# Patient Record
Sex: MALE | Race: WHITE | NOT HISPANIC OR LATINO
[De-identification: names, ages, dates, MRNs, and addresses within clinical notes are randomized per-mention and may not be internally consistent; named-entity substitution may affect disease eponyms.]

---

## 2017-09-20 ENCOUNTER — APPOINTMENT (OUTPATIENT)
Dept: GASTROENTEROLOGY | Facility: CLINIC | Age: 62
End: 2017-09-20
Payer: COMMERCIAL

## 2017-09-20 VITALS
HEIGHT: 65.5 IN | SYSTOLIC BLOOD PRESSURE: 118 MMHG | DIASTOLIC BLOOD PRESSURE: 80 MMHG | BODY MASS INDEX: 32.59 KG/M2 | WEIGHT: 198 LBS | TEMPERATURE: 98.4 F

## 2017-09-20 DIAGNOSIS — Z86.39 PERSONAL HISTORY OF OTHER ENDOCRINE, NUTRITIONAL AND METABOLIC DISEASE: ICD-10-CM

## 2017-09-20 DIAGNOSIS — I25.2 OLD MYOCARDIAL INFARCTION: ICD-10-CM

## 2017-09-20 DIAGNOSIS — Z80.1 FAMILY HISTORY OF MALIGNANT NEOPLASM OF TRACHEA, BRONCHUS AND LUNG: ICD-10-CM

## 2017-09-20 DIAGNOSIS — R12 HEARTBURN: ICD-10-CM

## 2017-09-20 DIAGNOSIS — R19.8 OTHER SPECIFIED SYMPTOMS AND SIGNS INVOLVING THE DIGESTIVE SYSTEM AND ABDOMEN: ICD-10-CM

## 2017-09-20 DIAGNOSIS — Z80.0 FAMILY HISTORY OF MALIGNANT NEOPLASM OF DIGESTIVE ORGANS: ICD-10-CM

## 2017-09-20 DIAGNOSIS — Z86.79 PERSONAL HISTORY OF OTHER DISEASES OF THE CIRCULATORY SYSTEM: ICD-10-CM

## 2017-09-20 DIAGNOSIS — Z12.11 ENCOUNTER FOR SCREENING FOR MALIGNANT NEOPLASM OF COLON: ICD-10-CM

## 2017-09-20 PROCEDURE — 99204 OFFICE O/P NEW MOD 45 MIN: CPT

## 2018-01-26 ENCOUNTER — APPOINTMENT (OUTPATIENT)
Dept: GASTROENTEROLOGY | Facility: AMBULATORY MEDICAL SERVICES | Age: 63
End: 2018-01-26
Payer: COMMERCIAL

## 2018-01-26 DIAGNOSIS — K29.01 ACUTE GASTRITIS WITH BLEEDING: ICD-10-CM

## 2018-01-26 PROCEDURE — 43239 EGD BIOPSY SINGLE/MULTIPLE: CPT

## 2018-01-26 PROCEDURE — 45380 COLONOSCOPY AND BIOPSY: CPT

## 2018-02-09 ENCOUNTER — APPOINTMENT (OUTPATIENT)
Dept: GASTROENTEROLOGY | Facility: CLINIC | Age: 63
End: 2018-02-09
Payer: COMMERCIAL

## 2018-02-09 VITALS
TEMPERATURE: 98.5 F | DIASTOLIC BLOOD PRESSURE: 80 MMHG | BODY MASS INDEX: 34.32 KG/M2 | SYSTOLIC BLOOD PRESSURE: 140 MMHG | WEIGHT: 206 LBS | HEIGHT: 65 IN

## 2018-02-09 PROCEDURE — 99213 OFFICE O/P EST LOW 20 MIN: CPT

## 2018-02-09 RX ORDER — CALCIUM CARB/MAGNESIUM CARB 311-232MG
TABLET ORAL
Refills: 0 | Status: DISCONTINUED | COMMUNITY
End: 2018-02-09

## 2018-02-09 RX ORDER — SODIUM SULFATE, POTASSIUM SULFATE, MAGNESIUM SULFATE 17.5; 3.13; 1.6 G/ML; G/ML; G/ML
17.5-3.13-1.6 SOLUTION, CONCENTRATE ORAL
Qty: 1 | Refills: 0 | Status: DISCONTINUED | COMMUNITY
Start: 2017-09-20 | End: 2018-02-09

## 2018-07-26 ENCOUNTER — RX RENEWAL (OUTPATIENT)
Age: 63
End: 2018-07-26

## 2018-08-02 ENCOUNTER — APPOINTMENT (OUTPATIENT)
Dept: GASTROENTEROLOGY | Facility: CLINIC | Age: 63
End: 2018-08-02
Payer: COMMERCIAL

## 2018-08-02 VITALS
DIASTOLIC BLOOD PRESSURE: 80 MMHG | WEIGHT: 200 LBS | HEART RATE: 85 BPM | BODY MASS INDEX: 33.32 KG/M2 | SYSTOLIC BLOOD PRESSURE: 150 MMHG | OXYGEN SATURATION: 97 % | HEIGHT: 65 IN | TEMPERATURE: 97.9 F

## 2018-08-02 PROCEDURE — 99213 OFFICE O/P EST LOW 20 MIN: CPT

## 2019-01-14 ENCOUNTER — RX RENEWAL (OUTPATIENT)
Age: 64
End: 2019-01-14

## 2019-10-23 ENCOUNTER — RX RENEWAL (OUTPATIENT)
Age: 64
End: 2019-10-23

## 2020-12-15 PROBLEM — Z12.11 ENCOUNTER FOR SCREENING FOR MALIGNANT NEOPLASM OF COLON: Status: RESOLVED | Noted: 2017-09-20 | Resolved: 2020-12-15

## 2022-01-07 ENCOUNTER — TRANSCRIPTION ENCOUNTER (OUTPATIENT)
Age: 67
End: 2022-01-07

## 2022-05-02 ENCOUNTER — APPOINTMENT (OUTPATIENT)
Dept: GASTROENTEROLOGY | Facility: CLINIC | Age: 67
End: 2022-05-02
Payer: COMMERCIAL

## 2022-05-02 VITALS
SYSTOLIC BLOOD PRESSURE: 132 MMHG | TEMPERATURE: 97.7 F | DIASTOLIC BLOOD PRESSURE: 78 MMHG | OXYGEN SATURATION: 98 % | HEIGHT: 65 IN | BODY MASS INDEX: 32.82 KG/M2 | HEART RATE: 76 BPM | WEIGHT: 197 LBS

## 2022-05-02 DIAGNOSIS — Z01.818 ENCOUNTER FOR OTHER PREPROCEDURAL EXAMINATION: ICD-10-CM

## 2022-05-02 DIAGNOSIS — D12.6 BENIGN NEOPLASM OF COLON, UNSPECIFIED: ICD-10-CM

## 2022-05-02 DIAGNOSIS — Z20.822 CONTACT WITH AND (SUSPECTED) EXPOSURE TO COVID-19: ICD-10-CM

## 2022-05-02 PROCEDURE — 99203 OFFICE O/P NEW LOW 30 MIN: CPT

## 2022-05-02 RX ORDER — CLOPIDOGREL BISULFATE 75 MG/1
75 TABLET, FILM COATED ORAL
Refills: 0 | Status: ACTIVE | COMMUNITY

## 2022-05-02 RX ORDER — VALSARTAN 160 MG/1
160 TABLET, COATED ORAL
Refills: 0 | Status: ACTIVE | COMMUNITY

## 2022-05-02 RX ORDER — TAMSULOSIN HYDROCHLORIDE 0.4 MG/1
0.4 CAPSULE ORAL
Refills: 0 | Status: ACTIVE | COMMUNITY

## 2022-05-02 RX ORDER — METOPROLOL SUCCINATE 50 MG/1
50 TABLET, EXTENDED RELEASE ORAL
Refills: 0 | Status: ACTIVE | COMMUNITY

## 2022-05-02 RX ORDER — AMLODIPINE BESYLATE 10 MG/1
10 TABLET ORAL
Refills: 0 | Status: ACTIVE | COMMUNITY

## 2022-05-02 RX ORDER — ASPIRIN 81 MG
81 TABLET, DELAYED RELEASE (ENTERIC COATED) ORAL
Refills: 0 | Status: DISCONTINUED | COMMUNITY
End: 2022-05-02

## 2022-05-02 RX ORDER — PANTOPRAZOLE 40 MG/1
40 TABLET, DELAYED RELEASE ORAL
Qty: 90 | Refills: 0 | Status: DISCONTINUED | COMMUNITY
Start: 2018-01-26 | End: 2022-05-02

## 2022-05-02 NOTE — ASSESSMENT
[FreeTextEntry1] : Patient with a history of adenomatous colonic polyps and a history of reflux esophagitis.  He gets intermittent heartburn off of medication.\par \par An EGD and colonoscopy have been scheduled. The risks, benefits, alternatives, and limitations of the procedures, including the possibility of missed lesions, were explained.  The patient will require cardiac clearance and anesthesia evaluation prior to the procedure as well as permission to hold clopidogrel for 5 days before.\par \par Patient was advised to stop using Francesca-Knob[place] and to use Tums as needed.\par \par \par Plan from 8/2/2018 - Patient with reflux esophagitis and erosive gastritis doing well on pantoprazole. He has a history of colonic polyps.\par \par We will try decreasing pantoprazole to 40 mg every other day to decrease the cumulative dosage. Additionally, A list of dietary and lifestyle modifications in the treatment of GERD was given to the patient. The importance of weight loss was stressed to the patient as well.\par \par Patient is due for colonoscopy in January 2021.\par \par Patient will return to see me in 3 months.\par \par \par Plan from 2/9/18 - Patient with reflux esophagitis and erosive gastritis. He also has adenomatous polyps which were removed and diverticulosis. He is feeling well on pantoprazole 40 mg q.d.\par \par Patient will continue pantoprazole 40 mg q.d.\par \par A list of dietary and lifestyle modifications in the treatment of GERD was given to the patient.\par \par Information was given to the patient regarding diverticulosis and a high-fiber diet.\par \par We will repeat a colonoscopy in 3 years that is January 2021.\par \par Patient will return to see me in 6 months.\par \par \par Plan from 9/20/17 - Patient due for a screening colonoscopy. He has irregular bowel movements. He has intermittent heartburn. He had cardiac stents placed after an MI in July 2016.\par \par An EGD and colonoscopy have been scheduled. The risks, benefits, alternatives, and limitations of the procedures, including the possibility of missed lesions, were explained. The patient will require both cardiac and anesthesia clearance prior to the procedures. We will also need to hold the Brilinta for 5 days prior to the procedure.

## 2022-05-02 NOTE — HISTORY OF PRESENT ILLNESS
[FreeTextEntry1] : The patient is a 66-year-old man with a history of adenomatous colonic polyps and a history of reflux esophagitis.  He also has a history of cardiac stents and MI in 2016 and 2009.  He is not on medication for reflux and notes intermittent heartburn about once a week.  He takes Francesca-[place] with relief.  He denies dysphagia or abdominal pain.  He reports 1-2 solid bowel movements a day without melena or bright red blood per rectum.  The patient's weight is stable.  He has not been hospitalized in the past year.\par \par \par Note from 8/2/2018 - The patient has a history of reflux esophagitis, erosive gastritis, and colonic polyps. He has been on pantoprazole 40 mg a day. He feels well denying heartburn, dysphagia, or abdominal pain. Bowel movements are normal without melena per her blood per rectum. The patient's weight is stable.\par \par \par Note from 2/9/18 - The patient is status post EGD and colonoscopy performed on January 26, 2018. EGD was significant for gross reflux esophagitis with biopsies showing reflux esophagitis. Additionally, the patient has moderate erosive gastritis. Colonoscopy was significant for removal of 2 adenomatous polyps along with diverticulosis and asymptomatic internal and external hemorrhoids. The patient has been on pantoprazole 40 mg q.d. and denies heartburn, dysphagia, or abdominal pain. Bowel movements are normal with 2 solid bowel movements a day. He denies melena or bright or blood per rectum.\par \par \par Note from 9/20/17 - The patient is a 61-year-old man who had an MI in July 2016 and in 2009. He had cardiac stents placed on both occasions. He denies chest pain or shortness of breath. He has not been hospitalized in the past year.\par \par The patient reports occasional mid to lower abdominal soreness. He has heartburn approximately once a week and takes antacids periodically. He will take the antiacid for 3 days in a row and then can go weeks without taking them. He denies dysphagia. He has one to 2 bowel movements a day which vary in consistency. There sometimes solids, sometimes loose, and sometimes difficult to pass. He denies melena or bright blood per rectum. His weight is stable. He is due for a screening colonoscopy.

## 2022-07-15 LAB — SARS-COV-2 N GENE NPH QL NAA+PROBE: NOT DETECTED

## 2022-07-18 ENCOUNTER — APPOINTMENT (OUTPATIENT)
Dept: GASTROENTEROLOGY | Facility: AMBULATORY MEDICAL SERVICES | Age: 67
End: 2022-07-18

## 2022-07-18 PROCEDURE — 45385 COLONOSCOPY W/LESION REMOVAL: CPT | Mod: 33

## 2022-07-18 PROCEDURE — 43239 EGD BIOPSY SINGLE/MULTIPLE: CPT | Mod: 59

## 2022-07-18 PROCEDURE — 45380 COLONOSCOPY AND BIOPSY: CPT | Mod: 33,59

## 2022-10-03 ENCOUNTER — APPOINTMENT (OUTPATIENT)
Dept: GASTROENTEROLOGY | Facility: CLINIC | Age: 67
End: 2022-10-03

## 2022-10-03 VITALS
HEART RATE: 62 BPM | DIASTOLIC BLOOD PRESSURE: 80 MMHG | HEIGHT: 65 IN | OXYGEN SATURATION: 98 % | TEMPERATURE: 96.8 F | WEIGHT: 191 LBS | SYSTOLIC BLOOD PRESSURE: 120 MMHG | BODY MASS INDEX: 31.82 KG/M2

## 2022-10-03 DIAGNOSIS — K64.4 RESIDUAL HEMORRHOIDAL SKIN TAGS: ICD-10-CM

## 2022-10-03 DIAGNOSIS — K57.30 DIVERTICULOSIS OF LARGE INTESTINE W/OUT PERFORATION OR ABSCESS W/OUT BLEEDING: ICD-10-CM

## 2022-10-03 DIAGNOSIS — K29.80 DUODENITIS W/OUT BLEEDING: ICD-10-CM

## 2022-10-03 DIAGNOSIS — D12.6 BENIGN NEOPLASM OF COLON, UNSPECIFIED: ICD-10-CM

## 2022-10-03 DIAGNOSIS — K64.8 OTHER HEMORRHOIDS: ICD-10-CM

## 2022-10-03 DIAGNOSIS — K26.9 DUODENAL ULCER, UNSPECIFIED AS ACUTE OR CHRONIC, W/OUT HEMORRHAGE OR PERFORATION: ICD-10-CM

## 2022-10-03 PROCEDURE — 99213 OFFICE O/P EST LOW 20 MIN: CPT

## 2022-10-03 RX ORDER — SODIUM SULFATE, POTASSIUM SULFATE, MAGNESIUM SULFATE 17.5; 3.13; 1.6 G/ML; G/ML; G/ML
17.5-3.13-1.6 SOLUTION, CONCENTRATE ORAL
Qty: 1 | Refills: 0 | Status: DISCONTINUED | COMMUNITY
Start: 2022-05-02 | End: 2022-10-03

## 2022-10-03 RX ORDER — METFORMIN ER 500 MG 500 MG/1
500 TABLET ORAL
Qty: 180 | Refills: 0 | Status: ACTIVE | COMMUNITY
Start: 2022-08-31

## 2022-10-03 RX ORDER — POLYETHYLENE GLYCOL-3350, SODIUM CHLORIDE, POTASSIUM CHLORIDE AND SODIUM BICARBONATE 420; 11.2; 5.72; 1.48 G/438.4G; G/438.4G; G/438.4G; G/438.4G
420 POWDER, FOR SOLUTION ORAL
Qty: 1 | Refills: 0 | Status: DISCONTINUED | COMMUNITY
Start: 2022-05-02 | End: 2022-10-03

## 2022-10-03 RX ORDER — DULAGLUTIDE 0.75 MG/.5ML
0.75 INJECTION, SOLUTION SUBCUTANEOUS
Qty: 2 | Refills: 0 | Status: ACTIVE | COMMUNITY
Start: 2022-06-30

## 2022-10-06 NOTE — HISTORY OF PRESENT ILLNESS
[FreeTextEntry1] : We reviewed the patient's EGD and colonoscopy performed on July 18, 2022.  EGD was significant for a 1 cm hiatal hernia with mild gastritis in the antrum, a duodenal bulb ulcer, and duodenitis.  Biopsies were significant for reflux esophagitis as well as intestinal metaplasia at the GE junction.  There was no H. pylori.  The patient takes Francesca-[place] and clopidogrel.  Colonoscopy was significant for removal of 5 polyps, 3 tubular adenomas and 2 hyperplastic.  The patient also has moderate diverticulosis involving the ascending, descending, sigmoid colon as well as internal and external hemorrhoids which are occasionally symptomatic.  The patient has been on pantoprazole 40 mg a day and feels well.  He denies heartburn, dysphagia, abdominal pain.  Bowel movements are normal with 1-2 bowel movements a day.  He denies melena or bright red blood per rectum.  The patient has lost 8 pounds intentionally.\par \par \par Note from 5/2/2022 - The patient is a 66-year-old man with a history of adenomatous colonic polyps and a history of reflux esophagitis.  He also has a history of cardiac stents and MI in 2016 and 2009.  He is not on medication for reflux and notes intermittent heartburn about once a week.  He takes Francesca-[place] with relief.  He denies dysphagia or abdominal pain.  He reports 1-2 solid bowel movements a day without melena or bright red blood per rectum.  The patient's weight is stable.  He has not been hospitalized in the past year.\par \par \par Note from 8/2/2018 - The patient has a history of reflux esophagitis, erosive gastritis, and colonic polyps. He has been on pantoprazole 40 mg a day. He feels well denying heartburn, dysphagia, or abdominal pain. Bowel movements are normal without melena per her blood per rectum. The patient's weight is stable.\par \par \par Note from 2/9/18 - The patient is status post EGD and colonoscopy performed on January 26, 2018. EGD was significant for gross reflux esophagitis with biopsies showing reflux esophagitis. Additionally, the patient has moderate erosive gastritis. Colonoscopy was significant for removal of 2 adenomatous polyps along with diverticulosis and asymptomatic internal and external hemorrhoids. The patient has been on pantoprazole 40 mg q.d. and denies heartburn, dysphagia, or abdominal pain. Bowel movements are normal with 2 solid bowel movements a day. He denies melena or bright or blood per rectum.\par \par \par Note from 9/20/17 - The patient is a 61-year-old man who had an MI in July 2016 and in 2009. He had cardiac stents placed on both occasions. He denies chest pain or shortness of breath. He has not been hospitalized in the past year.\par \par The patient reports occasional mid to lower abdominal soreness. He has heartburn approximately once a week and takes antacids periodically. He will take the antiacid for 3 days in a row and then can go weeks without taking them. He denies dysphagia. He has one to 2 bowel movements a day which vary in consistency. There sometimes solids, sometimes loose, and sometimes difficult to pass. He denies melena or bright blood per rectum. His weight is stable. He is due for a screening colonoscopy.

## 2022-10-06 NOTE — ASSESSMENT
[FreeTextEntry1] : Patient with a duodenal ulcer, duodenitis gastritis and a 1 cm hiatal hernia with biopsies showing reflux esophagitis and intestinal metaplasia at the GE junction.  Colonoscopy was significant for removal of adenomatous colonic polyps as well as diverticulosis.\par \par Patient will continue pantoprazole 40 mg a day.\par \par Information was given to the patient regarding diverticulosis and a high-fiber diet.\par \par We will repeat EGD and colonoscopy in 3 years that is July 2025.\par \par Patient will return to see me in 1 year or sooner if needed.\par \par \par Plan from 5/2/2022 - Patient with a history of adenomatous colonic polyps and a history of reflux esophagitis.  He gets intermittent heartburn off of medication.\par \par An EGD and colonoscopy have been scheduled. The risks, benefits, alternatives, and limitations of the procedures, including the possibility of missed lesions, were explained.  The patient will require cardiac clearance and anesthesia evaluation prior to the procedure as well as permission to hold clopidogrel for 5 days before.\par \par Patient was advised to stop using Francesca-[place] and to use Tums as needed.\par \par \par Plan from 8/2/2018 - Patient with reflux esophagitis and erosive gastritis doing well on pantoprazole. He has a history of colonic polyps.\par \par We will try decreasing pantoprazole to 40 mg every other day to decrease the cumulative dosage. Additionally, A list of dietary and lifestyle modifications in the treatment of GERD was given to the patient. The importance of weight loss was stressed to the patient as well.\par \par Patient is due for colonoscopy in January 2021.\par \par Patient will return to see me in 3 months.\par \par \par Plan from 2/9/18 - Patient with reflux esophagitis and erosive gastritis. He also has adenomatous polyps which were removed and diverticulosis. He is feeling well on pantoprazole 40 mg q.d.\par \par Patient will continue pantoprazole 40 mg q.d.\par \par A list of dietary and lifestyle modifications in the treatment of GERD was given to the patient.\par \par Information was given to the patient regarding diverticulosis and a high-fiber diet.\par \par We will repeat a colonoscopy in 3 years that is January 2021.\par \par Patient will return to see me in 6 months.\par \par \par Plan from 9/20/17 - Patient due for a screening colonoscopy. He has irregular bowel movements. He has intermittent heartburn. He had cardiac stents placed after an MI in July 2016.\par \par An EGD and colonoscopy have been scheduled. The risks, benefits, alternatives, and limitations of the procedures, including the possibility of missed lesions, were explained. The patient will require both cardiac and anesthesia clearance prior to the procedures. We will also need to hold the Brilinta for 5 days prior to the procedure.

## 2022-10-06 NOTE — REASON FOR VISIT
[FreeTextEntry1] : Hiatal hernia, reflux esophagitis, intestinal metaplasia at the GE junction, duodenal ulcer, duodenitis, gastritis, adenomatous colonic polyps, diverticulosis, hemorrhoids

## 2022-10-06 NOTE — CONSULT LETTER
[FreeTextEntry1] : Dear Dr. Oscar De La Cruz and Dr. Lasha Villagran,\par \par I had the pleasure of seeing your patient ADELINA SANTANA in the office today.  My office note is attached. PLEASE READ THE "ASSESSMENT" SECTION OF THE NOTE TO SEE MY IMPRESSION AND PLAN.\par \par Thank you very much for allowing me to participate in the care of your patient.\par \par Sincerely,\par \par Js Hargrove M.D., FAC, FACP\par Director, Celiac Program at [facility]\par  of Medicine, [employer] [employer] at [facility] Adjunct  of Medicine, [facility] Practice Director, [facility] - Gastroenterology at [place]\[facility] [address]\[place] [address]\par Tel: [phone]\par Email: [email or web address]\par \par \par The attached note has been created using a voice recognition system (Dragon).  There may be some misspellings and typos.  Please call my office if you have any issues or questions.

## 2023-01-03 ENCOUNTER — RX RENEWAL (OUTPATIENT)
Age: 68
End: 2023-01-03

## 2023-08-21 ENCOUNTER — RX RENEWAL (OUTPATIENT)
Age: 68
End: 2023-08-21

## 2023-10-25 ENCOUNTER — RX RENEWAL (OUTPATIENT)
Age: 68
End: 2023-10-25

## 2023-12-28 ENCOUNTER — RX RENEWAL (OUTPATIENT)
Age: 68
End: 2023-12-28

## 2024-02-29 ENCOUNTER — APPOINTMENT (OUTPATIENT)
Dept: GASTROENTEROLOGY | Facility: CLINIC | Age: 69
End: 2024-02-29
Payer: COMMERCIAL

## 2024-02-29 VITALS
BODY MASS INDEX: 29.32 KG/M2 | SYSTOLIC BLOOD PRESSURE: 128 MMHG | HEART RATE: 98 BPM | OXYGEN SATURATION: 99 % | WEIGHT: 176 LBS | DIASTOLIC BLOOD PRESSURE: 78 MMHG | TEMPERATURE: 97.5 F | HEIGHT: 65 IN

## 2024-02-29 DIAGNOSIS — Z86.010 PERSONAL HISTORY OF COLONIC POLYPS: ICD-10-CM

## 2024-02-29 DIAGNOSIS — K29.60 OTHER GASTRITIS W/OUT BLEEDING: ICD-10-CM

## 2024-02-29 DIAGNOSIS — R11.0 NAUSEA: ICD-10-CM

## 2024-02-29 DIAGNOSIS — K21.00 GASTRO-ESOPHAGEAL REFLUX DISEASE WITH ESOPHAGITIS, WITHOUT BLEEDING: ICD-10-CM

## 2024-02-29 DIAGNOSIS — K22.70 BARRETT'S ESOPHAGUS W/OUT DYSPLASIA: ICD-10-CM

## 2024-02-29 DIAGNOSIS — K44.9 DIAPHRAGMATIC HERNIA W/OUT OBSTRUCTION OR GANGRENE: ICD-10-CM

## 2024-02-29 PROCEDURE — 99214 OFFICE O/P EST MOD 30 MIN: CPT

## 2024-02-29 RX ORDER — GLIMEPIRIDE 1 MG/1
1 TABLET ORAL
Refills: 0 | Status: DISCONTINUED | COMMUNITY
End: 2024-02-29

## 2024-02-29 RX ORDER — PANTOPRAZOLE 40 MG/1
40 TABLET, DELAYED RELEASE ORAL
Qty: 90 | Refills: 3 | Status: ACTIVE | COMMUNITY
Start: 2022-07-18 | End: 1900-01-01

## 2024-02-29 NOTE — REASON FOR VISIT
[FreeTextEntry1] : Hiatal hernia, intestinal metaplasia at the GE junction, reflux esophagitis, erosive gastritis, nausea, history of adenomatous colonic polyps

## 2024-02-29 NOTE — PHYSICAL EXAM
[General Appearance - Alert] : alert [General Appearance - In No Acute Distress] : in no acute distress [Neck Appearance] : the appearance of the neck was normal [Neck Cervical Mass (___cm)] : no neck mass was observed [Thyroid Diffuse Enlargement] : the thyroid was not enlarged [Jugular Venous Distention Increased] : there was no jugular-venous distention [Thyroid Nodule] : there were no palpable thyroid nodules [Auscultation Breath Sounds / Voice Sounds] : lungs were clear to auscultation bilaterally [Heart Rate And Rhythm] : heart rate was normal and rhythm regular [Heart Sounds] : normal S1 and S2 [Heart Sounds Gallop] : no gallops [Murmurs] : no murmurs [Heart Sounds Pericardial Friction Rub] : no pericardial rub [Bowel Sounds] : normal bowel sounds [Edema] : there was no peripheral edema [Abdomen Soft] : soft [Abdomen Tenderness] : non-tender [] : no hepato-splenomegaly [Abdomen Mass (___ Cm)] : no abdominal mass palpated [No CVA Tenderness] : no ~M costovertebral angle tenderness [No Spinal Tenderness] : no spinal tenderness [Oriented To Time, Place, And Person] : oriented to person, place, and time [Impaired Insight] : insight and judgment were intact [Affect] : the affect was normal

## 2024-02-29 NOTE — CONSULT LETTER
[FreeTextEntry1] : Dear Dr. Oscar De La Cruz and Dr. Lasha Villagran,\par  \par  I had the pleasure of seeing your patient ADELINA SANTANA in the office today.  My office note is attached. PLEASE READ THE "ASSESSMENT" SECTION OF THE NOTE TO SEE MY IMPRESSION AND PLAN.\par  \par  Thank you very much for allowing me to participate in the care of your patient.\par  \par  Sincerely,\par  \par  Js Hargrove M.D., FAC, FACP\par  Director, Celiac Program at [facility]\par   of Medicine, [employer] at [facility]  Adjunct  of Medicine, [facility]  Practice Director, [facility] - Gastroenterology at [place]\[facility]  [address]\[place] [address]\par  Tel: [phone]\par  Email: [email or web address]\par  \par  \par  The attached note has been created using a voice recognition system (Dragon).  There may be some misspellings and typos.  Please call my office if you have any issues or questions.

## 2024-02-29 NOTE — ASSESSMENT
[FreeTextEntry1] : Patient with a history of duodenal ulcer, duodenitis, gastritis, reflux esophagitis, 1 cm hiatal hernia, intestinal metaplasia at the GE junction, and a history of adenomatous colonic polyps.  He has intermittent waves of nausea which began when he started taking Trulicity.  He is on pantoprazole 40 mg a day.  We had a long discussion regarding the patient's bouts of nausea.  They are most likely related to the Trulicity.  The patient states that they are not too severe and he will observe and inform me if they worsen in any way.  I discussed the possibility of stopping the Trulicity and also discussed further testing to evaluate the gallbladder and other possible etiologies.  Again, we will hold off unless his symptoms worsen.  Patient is due for EGD and colonoscopy in July 2025.  If the patient's symptoms do not worsen, he will return to see me in 1 year or sooner if needed.   Plan to 10/3/2022 - Patient with a duodenal ulcer, duodenitis gastritis and a 1 cm hiatal hernia with biopsies showing reflux esophagitis and intestinal metaplasia at the GE junction. Colonoscopy was significant for removal of adenomatous colonic polyps as well as diverticulosis.    Patient will continue pantoprazole 40 mg a day.    Information was given to the patient regarding diverticulosis and a high-fiber diet.    We will repeat EGD and colonoscopy in 3 years that is July 2025.    Patient will return to see me in 1 year or sooner if needed.      Plan from 5/2/2022 - Patient with a history of adenomatous colonic polyps and a history of reflux esophagitis. He gets intermittent heartburn off of medication.    An EGD and colonoscopy have been scheduled. The risks, benefits, alternatives, and limitations of the procedures, including the possibility of missed lesions, were explained. The patient will require cardiac clearance and anesthesia evaluation prior to the procedure as well as permission to hold clopidogrel for 5 days before.    Patient was advised to stop using Francesca-[place]un[place] and to use Tums as needed.      Plan from 8/2/2018 - Patient with reflux esophagitis and erosive gastritis doing well on pantoprazole. He has a history of colonic polyps.    We will try decreasing pantoprazole to 40 mg every other day to decrease the cumulative dosage. Additionally, A list of dietary and lifestyle modifications in the treatment of GERD was given to the patient. The importance of weight loss was stressed to the patient as well.    Patient is due for colonoscopy in January 2021.    Patient will return to see me in 3 months.      Plan from 2/9/18 - Patient with reflux esophagitis and erosive gastritis. He also has adenomatous polyps which were removed and diverticulosis. He is feeling well on pantoprazole 40 mg q.d.    Patient will continue pantoprazole 40 mg q.d.    A list of dietary and lifestyle modifications in the treatment of GERD was given to the patient.    Information was given to the patient regarding diverticulosis and a high-fiber diet.    We will repeat a colonoscopy in 3 years that is January 2021.    Patient will return to see me in 6 months.      Plan from 9/20/17 - Patient due for a screening colonoscopy. He has irregular bowel movements. He has intermittent heartburn. He had cardiac stents placed after an MI in July 2016.    An EGD and colonoscopy have been scheduled. The risks, benefits, alternatives, and limitations of the procedures, including the possibility of missed lesions, were explained. The patient will require both cardiac and anesthesia clearance prior to the procedures. We will also need to hold the Brilinta for 5 days prior to the procedure.

## 2024-02-29 NOTE — HISTORY OF PRESENT ILLNESS
[FreeTextEntry1] : Patient has a history of duodenal ulcer, duodenitis, erosive gastritis, reflux esophagitis, 1 cm hiatal hernia, intestinal metaplasia at the GE junction, and a history of adenomatous colonic polyps.  He is on pantoprazole 40 mg a day.  He denies heartburn.  He gets intermittent waves of nausea that occur virtually daily.  These began when he started using Trulicity.  They last a few minutes at a time.  They can resolve spontaneously or are resolved with drinking water.  These can occur in the middle the night or during the day before or after meals.  He denies abdominal pain.  He has 3 solid bowel movements in the morning without melena or bright red blood per rectum.  He has lost weight as he is eating less on the Trulicity.  The patient has not been hospitalized in the past year.  He has cardiac stents and is on clopidogrel.   Note from 10/3/2022 - We reviewed the patient's EGD and colonoscopy performed on July 18, 2022.  EGD was significant for a 1 cm hiatal hernia with mild gastritis in the antrum, a duodenal bulb ulcer, and duodenitis.  Biopsies were significant for reflux esophagitis as well as intestinal metaplasia at the GE junction.  There was no H. pylori.  The patient takes Francesca-[place] and clopidogrel.  Colonoscopy was significant for removal of 5 polyps, 3 tubular adenomas and 2 hyperplastic.  The patient also has moderate diverticulosis involving the ascending, descending, sigmoid colon as well as internal and external hemorrhoids which are occasionally symptomatic.  The patient has been on pantoprazole 40 mg a day and feels well.  He denies heartburn, dysphagia, abdominal pain.  Bowel movements are normal with 1-2 bowel movements a day.  He denies melena or bright red blood per rectum.  The patient has lost 8 pounds intentionally.   Note from 5/2/2022 - The patient is a 66-year-old man with a history of adenomatous colonic polyps and a history of reflux esophagitis.  He also has a history of cardiac stents and MI in 2016 and 2009.  He is not on medication for reflux and notes intermittent heartburn about once a week.  He takes Francesca-[place] with relief.  He denies dysphagia or abdominal pain.  He reports 1-2 solid bowel movements a day without melena or bright red blood per rectum.  The patient's weight is stable.  He has not been hospitalized in the past year.   Note from 8/2/2018 - The patient has a history of reflux esophagitis, erosive gastritis, and colonic polyps. He has been on pantoprazole 40 mg a day. He feels well denying heartburn, dysphagia, or abdominal pain. Bowel movements are normal without melena per her blood per rectum. The patient's weight is stable.   Note from 2/9/18 - The patient is status post EGD and colonoscopy performed on January 26, 2018. EGD was significant for gross reflux esophagitis with biopsies showing reflux esophagitis. Additionally, the patient has moderate erosive gastritis. Colonoscopy was significant for removal of 2 adenomatous polyps along with diverticulosis and asymptomatic internal and external hemorrhoids. The patient has been on pantoprazole 40 mg q.d. and denies heartburn, dysphagia, or abdominal pain. Bowel movements are normal with 2 solid bowel movements a day. He denies melena or bright or blood per rectum.   Note from 9/20/17 - The patient is a 61-year-old man who had an MI in July 2016 and in 2009. He had cardiac stents placed on both occasions. He denies chest pain or shortness of breath. He has not been hospitalized in the past year.  The patient reports occasional mid to lower abdominal soreness. He has heartburn approximately once a week and takes antacids periodically. He will take the antiacid for 3 days in a row and then can go weeks without taking them. He denies dysphagia. He has one to 2 bowel movements a day which vary in consistency. There sometimes solids, sometimes loose, and sometimes difficult to pass. He denies melena or bright blood per rectum. His weight is stable. He is due for a screening colonoscopy.

## 2025-04-10 ENCOUNTER — RX RENEWAL (OUTPATIENT)
Age: 70
End: 2025-04-10

## 2025-05-29 ENCOUNTER — APPOINTMENT (OUTPATIENT)
Dept: GASTROENTEROLOGY | Facility: CLINIC | Age: 70
End: 2025-05-29
Payer: COMMERCIAL

## 2025-05-29 VITALS
BODY MASS INDEX: 29.16 KG/M2 | HEIGHT: 65 IN | WEIGHT: 175 LBS | TEMPERATURE: 97.5 F | HEART RATE: 61 BPM | SYSTOLIC BLOOD PRESSURE: 132 MMHG | DIASTOLIC BLOOD PRESSURE: 70 MMHG | OXYGEN SATURATION: 99 %

## 2025-05-29 DIAGNOSIS — K29.80 DUODENITIS W/OUT BLEEDING: ICD-10-CM

## 2025-05-29 DIAGNOSIS — K29.60 OTHER GASTRITIS W/OUT BLEEDING: ICD-10-CM

## 2025-05-29 DIAGNOSIS — R19.5 OTHER FECAL ABNORMALITIES: ICD-10-CM

## 2025-05-29 DIAGNOSIS — K44.9 DIAPHRAGMATIC HERNIA W/OUT OBSTRUCTION OR GANGRENE: ICD-10-CM

## 2025-05-29 DIAGNOSIS — K21.00 GASTRO-ESOPHAGEAL REFLUX DISEASE WITH ESOPHAGITIS, WITHOUT BLEEDING: ICD-10-CM

## 2025-05-29 DIAGNOSIS — K22.70 BARRETT'S ESOPHAGUS W/OUT DYSPLASIA: ICD-10-CM

## 2025-05-29 DIAGNOSIS — Z86.0101 PERSONAL HISTORY OF ADENOMATOUS AND SERRATED COLON POLYPS: ICD-10-CM

## 2025-05-29 DIAGNOSIS — Z78.9 OTHER SPECIFIED HEALTH STATUS: ICD-10-CM

## 2025-05-29 PROCEDURE — 99214 OFFICE O/P EST MOD 30 MIN: CPT

## 2025-05-29 RX ORDER — SODIUM PICOSULFATE, MAGNESIUM OXIDE, AND ANHYDROUS CITRIC ACID 12; 3.5; 1 G/175ML; G/175ML; MG/175ML
10-3.5-12 MG-GM LIQUID ORAL
Qty: 2 | Refills: 0 | Status: ACTIVE | COMMUNITY
Start: 2025-05-29 | End: 1900-01-01

## 2025-08-13 ENCOUNTER — NON-APPOINTMENT (OUTPATIENT)
Age: 70
End: 2025-08-13

## 2025-09-02 ENCOUNTER — APPOINTMENT (OUTPATIENT)
Dept: GASTROENTEROLOGY | Facility: AMBULATORY MEDICAL SERVICES | Age: 70
End: 2025-09-02
Payer: COMMERCIAL

## 2025-09-02 PROCEDURE — 45380 COLONOSCOPY AND BIOPSY: CPT | Mod: 33,59

## 2025-09-02 PROCEDURE — 45385 COLONOSCOPY W/LESION REMOVAL: CPT | Mod: 33

## 2025-09-02 PROCEDURE — 43239 EGD BIOPSY SINGLE/MULTIPLE: CPT | Mod: 59

## 2025-09-10 ENCOUNTER — RX RENEWAL (OUTPATIENT)
Age: 70
End: 2025-09-10